# Patient Record
(demographics unavailable — no encounter records)

---

## 2025-06-03 NOTE — ASSESSMENT
[FreeTextEntry1] : 56 yo male with h/o DM, HTN, HL presenting with SOB and for evaluation. 1. SOB. May be due to deconditioning but given lack of recent testing and DM, will plan for stress test and echo to assess cardiac function. 2. Risk stratification. Plan for calcium score to check for coronary calcification. 3. DM. Cont to follow up with PCP and work on lowering A1C and improving control. 4. HL. Cont statin therapy. 5. HTN. Consider switching amlodipine to ACE or ARB given h/o DM.

## 2025-06-03 NOTE — HISTORY OF PRESENT ILLNESS
[FreeTextEntry1] : 56yo male with h/o HTN, HL, and DM presenting for evaluation. He c/o SOB with walking uphill. Does not exercise regularly. Takes medications as prescribed. Denies tob/etoh/drugs. Denies CP or palpitations. Otherwise able to do what he wants to do. Most recent Cr is 1.39 with microalbuminemia.  A1C was 7.5.  Had stress test about 10-15 years ago with no known abnormalities.2

## 2025-06-03 NOTE — PHYSICAL EXAM
[Well Developed] : well developed [Well Nourished] : well nourished [No Acute Distress] : no acute distress [Normal Conjunctiva] : normal conjunctiva [Normal Venous Pressure] : normal venous pressure [No Carotid Bruit] : no carotid bruit [Normal S1, S2] : normal S1, S2 [No Murmur] : no murmur [No Rub] : no rub [No Gallop] : no gallop [Clear Lung Fields] : clear lung fields [Good Air Entry] : good air entry [No Respiratory Distress] : no respiratory distress  [Soft] : abdomen soft [Non Tender] : non-tender [Normal Gait] : normal gait [No Edema] : no edema [No Skin Lesions] : no skin lesions [Moves all extremities] : moves all extremities [Normal Speech] : normal speech [Alert and Oriented] : alert and oriented